# Patient Record
Sex: MALE | Race: OTHER
[De-identification: names, ages, dates, MRNs, and addresses within clinical notes are randomized per-mention and may not be internally consistent; named-entity substitution may affect disease eponyms.]

---

## 2019-01-08 ENCOUNTER — APPOINTMENT (OUTPATIENT)
Dept: ORTHOPEDIC SURGERY | Facility: CLINIC | Age: 54
End: 2019-01-08
Payer: MEDICAID

## 2019-01-08 VITALS
HEART RATE: 83 BPM | DIASTOLIC BLOOD PRESSURE: 85 MMHG | WEIGHT: 240 LBS | SYSTOLIC BLOOD PRESSURE: 128 MMHG | HEIGHT: 69 IN | BODY MASS INDEX: 35.55 KG/M2

## 2019-01-08 DIAGNOSIS — Z78.9 OTHER SPECIFIED HEALTH STATUS: ICD-10-CM

## 2019-01-08 PROBLEM — Z00.00 ENCOUNTER FOR PREVENTIVE HEALTH EXAMINATION: Status: ACTIVE | Noted: 2019-01-08

## 2019-01-08 PROCEDURE — 99205 OFFICE O/P NEW HI 60 MIN: CPT

## 2019-01-08 PROCEDURE — 72100 X-RAY EXAM L-S SPINE 2/3 VWS: CPT

## 2019-01-08 NOTE — CONSULT LETTER
[Dear  ___] : Dear  [unfilled], [I had the pleasure of evaluating your patient, [unfilled].] : I had the pleasure of evaluating your patient, [unfilled]. [Please see my note below.] : Please see my note below. [Sincerely,] : Sincerely, [FreeTextEntry3] : Denny Clemens MD

## 2019-01-08 NOTE — DISCUSSION/SUMMARY
[de-identified] : 54 yo male with lumbar herniated disc/lateral recess stenosis L45 right side with failure of PT, NSAIDS, and JOHN. Recommend Posterior Rigth L45 hemilaminotomy and discectomy.  Updated MRI prior to surgery. \par \par I reviewed all major risks, benefits, and complications associated with lumbar laminectomy and/or microdiscectomy including but not limited to bleeding, infection, CSF leak, neurological injury, chronic pain, reherniation, hematoma worsening of pain, anesthetic risk, chronic pain and disability, need for further surgical intervention, medical complications (GI, , DVT, PE, cardiac, pulmonary, etc) and risk of mortality.\par

## 2019-01-08 NOTE — HISTORY OF PRESENT ILLNESS
[All Other ROS Normal] : All other review of systems are negative except as noted [Joint Pain] : joint pain [All Hx] : past medical, family, and social [All] : medication and allergy [Walking] : worsened by walking [Acupuncture] : relieved by acupuncture [Spinal Injections] : relieved by spinal injections [Physical Therapy] : relieved by physical therapy [Rest] : relieved by rest [NSAIDs] : relieved by nonsteroidal anti-inflammatory drugs [Recumbency] : relieved by recumbency [8] : currently ~his/her~ pain is 8 out of 10 [Bending] : not worsened by bending [Lifting] : not worsened by lifting [Prolonged Sitting] : not worsened by prolonged sitting [Prolonged Standing] : not worsened by prolonged standing [Incontinence] : no incontinence [Urinary Ret.] : no urinary retention [Joint Swelling] : no joint swelling [FreeTextEntry1] : Low back pain. recommended for evaluation by Dr Wenceslao Ames [FreeTextEntry2] : WALE PEMBERTON is a 53 y.o. male who presents with c/o intermittent low back pain for the past 1 year s/p slip form chair.  Pain radiates to right buttocks and posterior aspect of right calf, associated with tinglig on toes of right foot, denies numbness, weakness on B/L LE. Pt takes Tizanidine, this provides mild relief in pain, pt completed PT, this provided relief in pain for a few hrs. Pt had LESI X 5 last injection 2 months ago, performed by Dr Ames, this provided 95% temporary improvement in pain for 2-3 months. Pt states pain affects ADL\par

## 2019-01-08 NOTE — PHYSICAL EXAM
[Normal] : normal [ALL] : dorsalis pedis, posterior tibial, femoral, popliteal, and radial 2+ and symmetric bilaterally [Limited] : is limited [Painful] : is painful [SLR] : positive straight leg raise [NL] : Motor strength of the left lower extremity was normal [Motor Strength Lower Extremities] : right (5/5) [] : Sensory: [L5-RT] : L5 [0] : right ankle jerk 0 [1+] : left ankle jerk 1+ [Poor Appearance] : well-appearing [Acute Distress] : not in acute distress [Obese] : not obese [Abl Mood] : in a normal mood [Abl Affect] : with normal affect [Poor Coordination] : normal coordination [Disorientation] : oriented x 3 [Mackey's Sign] : negative Mackey's sign [Pronator Drift] : negative pronator drift [FreeTextEntry2] : NTTP L spine and b/l paraspinals L region. \par Skin intact L spine. No rashes, ulcers, blisters. \par No lymphedema. \par Rapid alternating movements- Intact. \par Full and non-painful ROM RUE, LUE, RLE, and LLE. Skin intact RUE, LUE, RLE, and LLE. No rashes, blisters, ulcers. NTTP RUE, LUE, RLE, and LLE. No evidence of dislocation or subluxation B/L.\par  [de-identified] : Lumbar [de-identified] : 2 views AP and Lat of LS Spine from [id number] 01/08/19. Read and dictated by Dr. Denny Clemens Board Certified orthopaedic surgeon DDD L4-S1\par \par \par MRI lumbar spine Maikel Corona 02/15/18: DDD and facet arthrosis with impingement of the exiting right L4 and descending right L5 nerve roots at L4-L5, and impingement of the exiting left greater than right L5 nerve roots and descending left S1 nerve root at L5-S1.

## 2019-01-28 ENCOUNTER — RX RENEWAL (OUTPATIENT)
Age: 54
End: 2019-01-28

## 2019-01-31 ENCOUNTER — APPOINTMENT (OUTPATIENT)
Dept: ORTHOPEDIC SURGERY | Facility: CLINIC | Age: 54
End: 2019-01-31
Payer: MEDICAID

## 2019-01-31 PROCEDURE — 99214 OFFICE O/P EST MOD 30 MIN: CPT

## 2019-01-31 NOTE — DISCUSSION/SUMMARY
[de-identified] : 52 yo male with lumbar herniated disc/lateral recess stenosis L45 right side with failure of PT, NSAIDS, and JOHN. Recommend Posterior Rigth L45 hemilaminotomy and discectomy. Updated MRI prior to surgery. \par \par I reviewed all major risks, benefits, and complications associated with lumbar laminectomy and/or microdiscectomy including but not limited to bleeding, infection, CSF leak, neurological injury, chronic pain, reherniation, hematoma worsening of pain, anesthetic risk, chronic pain and disability, need for further surgical intervention, medical complications (GI, , DVT, PE, cardiac, pulmonary, etc) and risk of mortality.\par

## 2019-01-31 NOTE — PHYSICAL EXAM
[] : Motor: [Motor Strength Lower Extremities] : right (weak) [NL] : normal and symmetric bilaterally [UE/LE] : Sensory: Intact in bilateral upper & lower extremities [0] : right ankle jerk 0 [1+] : left ankle jerk 1+ [ALL] : dorsalis pedis, posterior tibial, femoral, popliteal, and radial 2+ and symmetric bilaterally [de-identified] : Range of Motion: is limited and is painful. Lumbar. \par NTTP L spine and b/l paraspinals L region. \par Skin intact L spine. No rashes, ulcers, blisters. \par No lymphedema. [de-identified] : 2 views AP and Lat of LS Spine from [id number] 01/08/19. Read and dictated by Dr. Denny Clemens Board Certified orthopaedic surgeon DDD L4-S1\par \par \par MRI lumbar spine Rehanfrederic Chloe 02/15/18: DDD and facet arthrosis with impingement of the exiting right L4 and descending right L5 nerve roots at L4-L5, and impingement of the exiting left greater than right L5 nerve roots and descending left S1 nerve root at L5-S1. \par

## 2019-01-31 NOTE — HISTORY OF PRESENT ILLNESS
[Walking] : worsened by walking [Acupuncture] : relieved by acupuncture [NSAIDs] : relieved by nonsteroidal anti-inflammatory drugs [Physical Therapy] : relieved by physical therapy [Recumbency] : relieved by recumbency [Rest] : relieved by rest [7] : a current pain level of 7/10 [1] : a minimum pain level of 1/10 [9] : a maximum pain level of 9/10 [Constant] : ~He/She~ states the symptoms seem to be constant [de-identified] : Pt with lumbar herniated disc/lateral recess stenosis L45 right side with failure of PT, NSAIDS, and JOHN. Presents today for f/u results of lumbar MRI and to discuss surgical intervention Pain radiates to right buttocks and posterior aspect of right calf, associated with tingling on toes of right foot, denies numbness, weakness on B/L LE. Pt takes Tizanidine, this provides mild relief in pain, pt completed PT, this provided relief in pain for a few hrs. Pt had LESI X 5 last injection 2 months ago, performed by Dr Ames, this provided 95% temporary improvement in pain for 2-3 months. Pt states pain affects ADL\par  [Bending] : not worsened by bending [Lifting] : not worsened by lifting [Prolonged Sitting] : not worsened by prolonged sitting [Prolonged Standing] : not worsened by prolonged standing [Incontinence] : no incontinence [Urinary Ret.] : no urinary retention

## 2019-02-01 ENCOUNTER — OUTPATIENT (OUTPATIENT)
Dept: OUTPATIENT SERVICES | Facility: HOSPITAL | Age: 54
LOS: 1 days | End: 2019-02-01
Payer: MEDICAID

## 2019-02-01 VITALS
OXYGEN SATURATION: 96 % | SYSTOLIC BLOOD PRESSURE: 126 MMHG | HEART RATE: 70 BPM | RESPIRATION RATE: 15 BRPM | DIASTOLIC BLOOD PRESSURE: 90 MMHG | HEIGHT: 69 IN | TEMPERATURE: 98 F | WEIGHT: 246.92 LBS

## 2019-02-01 DIAGNOSIS — M51.26 OTHER INTERVERTEBRAL DISC DISPLACEMENT, LUMBAR REGION: ICD-10-CM

## 2019-02-01 DIAGNOSIS — Z98.890 OTHER SPECIFIED POSTPROCEDURAL STATES: Chronic | ICD-10-CM

## 2019-02-01 DIAGNOSIS — M54.5 LOW BACK PAIN: ICD-10-CM

## 2019-02-01 DIAGNOSIS — Z29.9 ENCOUNTER FOR PROPHYLACTIC MEASURES, UNSPECIFIED: ICD-10-CM

## 2019-02-01 DIAGNOSIS — M54.16 RADICULOPATHY, LUMBAR REGION: ICD-10-CM

## 2019-02-01 DIAGNOSIS — Z01.818 ENCOUNTER FOR OTHER PREPROCEDURAL EXAMINATION: ICD-10-CM

## 2019-02-01 DIAGNOSIS — Z93.3 COLOSTOMY STATUS: Chronic | ICD-10-CM

## 2019-02-01 DIAGNOSIS — G47.33 OBSTRUCTIVE SLEEP APNEA (ADULT) (PEDIATRIC): ICD-10-CM

## 2019-02-01 LAB
HCT VFR BLD CALC: 49 % — SIGNIFICANT CHANGE UP (ref 39–50)
HGB BLD-MCNC: 16.9 G/DL — SIGNIFICANT CHANGE UP (ref 13–17)
MCHC RBC-ENTMCNC: 32.4 PG — SIGNIFICANT CHANGE UP (ref 27–34)
MCHC RBC-ENTMCNC: 34.5 GM/DL — SIGNIFICANT CHANGE UP (ref 32–36)
MCV RBC AUTO: 93.9 FL — SIGNIFICANT CHANGE UP (ref 80–100)
MRSA PCR RESULT.: SIGNIFICANT CHANGE UP
PLATELET # BLD AUTO: 247 K/UL — SIGNIFICANT CHANGE UP (ref 150–400)
RBC # BLD: 5.22 M/UL — SIGNIFICANT CHANGE UP (ref 4.2–5.8)
RBC # FLD: 13.3 % — SIGNIFICANT CHANGE UP (ref 10.3–14.5)
S AUREUS DNA NOSE QL NAA+PROBE: SIGNIFICANT CHANGE UP
WBC # BLD: 7.01 K/UL — SIGNIFICANT CHANGE UP (ref 3.8–10.5)
WBC # FLD AUTO: 7.01 K/UL — SIGNIFICANT CHANGE UP (ref 3.8–10.5)

## 2019-02-01 PROCEDURE — 87640 STAPH A DNA AMP PROBE: CPT

## 2019-02-01 PROCEDURE — G0463: CPT

## 2019-02-01 PROCEDURE — G9001: CPT

## 2019-02-01 PROCEDURE — 87641 MR-STAPH DNA AMP PROBE: CPT

## 2019-02-01 PROCEDURE — 85027 COMPLETE CBC AUTOMATED: CPT

## 2019-02-01 NOTE — H&P PST ADULT - ATTENDING COMMENTS
54 yo male with lumbar herniated disc/lateral recess stenosis L45 right side with failure of PT, NSAIDS, and JOHN. Recommend Posterior Rigth L45 hemilaminotomy and discectomy.     I reviewed all major risks, benefits, and complications associated with lumbar laminectomy and/or microdiscectomy including but not limited to bleeding, infection, CSF leak, neurological injury, chronic pain, reherniation, hematoma worsening of pain, anesthetic risk, chronic pain and disability, need for further surgical intervention, medical complications (GI, , DVT, PE, cardiac, pulmonary, etc) and risk of mortality.

## 2019-02-01 NOTE — H&P PST ADULT - PSH
History of circumcision  age 47 History of circumcision  age 47  Status post colostomy  with appendectomy, 2006, and removal 2 mos later

## 2019-02-01 NOTE — H&P PST ADULT - HISTORY OF PRESENT ILLNESS
54 y/o male with hx of diverticulitis (s/p colostomy removal, 2006) c/o intermittent lower back pain for the past year, s/p slipped off a chair. Patient reports pain radiates down right buttocks to RLE with associated numbness and tingling, worsens with ambulation. Diagnostic imaging revealed lumbar herniated discs- evaluated by Dr. Clemens. Presents to [facility] for a scheduled right L4-5 posterior lumbar hemilaminotomy and discectomy on 2/8/2019.

## 2019-02-01 NOTE — H&P PST ADULT - PRO TOBACCO TYPE
cigarettes/Quit 5 month ago, smoked "1 cigarette daily" Quit 5 month ago, smoked "1-2 cigarettes daily"/cigarettes

## 2019-02-01 NOTE — H&P PST ADULT - ASSESSMENT
CAPRINI SCORE [CLOT updated 18]    AGE RELATED RISK FACTORS                                                       MOBILITY RELATED FACTORS  [x] Age 41-60 years                                            (1 Point)                    [ ] Bed rest                                                        (1 Point)  [ ] Age: 61-74 years                                           (2 Points)                  [ ] Plaster cast                                                   (2 Points)  [ ] Age= 75 years                                              (3 Points)                    [ ] Bed bound for more than 72 hours                 (2 Points)    DISEASE RELATED RISK FACTORS                                               GENDER SPECIFIC FACTORS  [ ] Edema in the lower extremities                       (1 Point)              [ ] Pregnancy                                                     (1 Point)  [ ] Varicose veins                                               (1 Point)                     [ ] Post-partum < 6 weeks                                   (1 Point)             [x] BMI > 25 Kg/m2                                            (1 Point)                     [ ] Hormonal therapy  or oral contraception          (1 Point)                 [ ] Sepsis (in the previous month)                        (1 Point)               [ ] History of pregnancy complications                 (1 point)  [ ] Pneumonia or serious lung disease                                               [ ] Unexplained or recurrent                     (1 Point)           (in the previous month)                               (1 Point)  [ ] Abnormal pulmonary function test                     (1 Point)                 SURGERY RELATED RISK FACTORS  [ ] Acute myocardial infarction                              (1 Point)               [ ]  Section                                             (1 Point)  [ ] Congestive heart failure (in the previous month)  (1 Point)      [ ] Minor surgery                                                  (1 Point)   [ ] Inflammatory bowel disease                             (1 Point)               [ ] Arthroscopic surgery                                        (2 Points)  [ ] Central venous access                                      (2 Points)                [x] General surgery lasting more than 45 minutes (2 points)  [ ] Present or previous malignancy                     (2 Points)                [ ] Elective arthroplasty                                         (5 points)    [ ] Stroke (in the previous month)                          (5 Points)                                                                                                                                                           HEMATOLOGY RELATED FACTORS                                                 TRAUMA RELATED RISK FACTORS  [ ] Prior episodes of VTE                                     (3 Points)                [ ] Fracture of the hip, pelvis, or leg                       (5 Points)  [ ] Positive family history for VTE                         (3 Points)             [ ] Acute spinal cord injury (in the previous month)  (5 Points)  [ ] Prothrombin 97094 A                                     (3 Points)               [ ] Paralysis  (less than 1 month)                             (5 Points)  [ ] Factor V Leiden                                             (3 Points)                  [ ] Multiple Trauma within 1 month                        (5 Points)  [ ] Lupus anticoagulants                                     (3 Points)                                                           [ ] Anticardiolipin antibodies                               (3 Points)                                                       [ ] High homocysteine in the blood                      (3 Points)                                             [ ] Other congenital or acquired thrombophilia      (3 Points)                                                [ ] Heparin induced thrombocytopenia                  (3 Points)                                     Total Score [    4      ]

## 2019-02-01 NOTE — H&P PST ADULT - AS BP NONINV METHOD
With hairline fracture of the right acetabular roof and femur neck.  Likely pathological.  BEATRICE with no increase in immunoglobulins.  Bone marrow biopsy 12/24 was negative for any abnormalities.  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  CT scan of the chest showed 2 nodules in the right upper lobe with the largest one looks spiculated and measures around 11 mm.  MRI brain showed no intracranial metastasis but calvarial and C2 possible bony metastasis.  Bone scan showed extensive foci of activity in the spine, ribs, pelvis, calvarium, and femora.  Oncology will f/u outpatient with results of immunotherapy foundation testing.  Dr. Shah recommends simulation (done 1/3) and treatment started 1/7 for 10 fractions.       electronic

## 2019-02-01 NOTE — H&P PST ADULT - PROBLEM SELECTOR PLAN 1
Scheduled right L4-5 posterior lumbar hemilaminotomy and discectomy on 2/8/2019  Pre-op instructions given, including chlorhexidene soap, lab work sent at PST

## 2019-02-01 NOTE — H&P PST ADULT - NSANTHOSAYNRD_GEN_A_CORE
No. MORAIMA screening performed.  STOP BANG Legend: 0-2 = LOW Risk; 3-4 = INTERMEDIATE Risk; 5-8 = HIGH Risk/Neck 18.5"

## 2019-02-07 ENCOUNTER — TRANSCRIPTION ENCOUNTER (OUTPATIENT)
Age: 54
End: 2019-02-07

## 2019-02-08 ENCOUNTER — APPOINTMENT (OUTPATIENT)
Dept: ORTHOPEDIC SURGERY | Facility: HOSPITAL | Age: 54
End: 2019-02-08

## 2019-02-08 ENCOUNTER — RESULT REVIEW (OUTPATIENT)
Age: 54
End: 2019-02-08

## 2019-02-08 ENCOUNTER — INPATIENT (INPATIENT)
Facility: HOSPITAL | Age: 54
LOS: 0 days | Discharge: HOME | DRG: 520 | End: 2019-02-08
Attending: ORTHOPAEDIC SURGERY | Admitting: ORTHOPAEDIC SURGERY
Payer: MEDICAID

## 2019-02-08 VITALS
TEMPERATURE: 98 F | OXYGEN SATURATION: 97 % | SYSTOLIC BLOOD PRESSURE: 125 MMHG | DIASTOLIC BLOOD PRESSURE: 84 MMHG | RESPIRATION RATE: 18 BRPM | HEART RATE: 94 BPM

## 2019-02-08 VITALS
HEIGHT: 69 IN | RESPIRATION RATE: 18 BRPM | HEART RATE: 84 BPM | OXYGEN SATURATION: 97 % | WEIGHT: 246.92 LBS | SYSTOLIC BLOOD PRESSURE: 118 MMHG | DIASTOLIC BLOOD PRESSURE: 80 MMHG | TEMPERATURE: 98 F

## 2019-02-08 DIAGNOSIS — M51.26 OTHER INTERVERTEBRAL DISC DISPLACEMENT, LUMBAR REGION: ICD-10-CM

## 2019-02-08 DIAGNOSIS — Z93.3 COLOSTOMY STATUS: Chronic | ICD-10-CM

## 2019-02-08 DIAGNOSIS — Z98.890 OTHER SPECIFIED POSTPROCEDURAL STATES: Chronic | ICD-10-CM

## 2019-02-08 DIAGNOSIS — M54.16 RADICULOPATHY, LUMBAR REGION: ICD-10-CM

## 2019-02-08 PROCEDURE — 63047 LAM FACETEC & FORAMOT LUMBAR: CPT

## 2019-02-08 PROCEDURE — 72020 X-RAY EXAM OF SPINE 1 VIEW: CPT | Mod: 26

## 2019-02-08 PROCEDURE — 88304 TISSUE EXAM BY PATHOLOGIST: CPT | Mod: 26

## 2019-02-08 RX ORDER — DEXAMETHASONE 0.5 MG/5ML
5 ELIXIR ORAL EVERY 6 HOURS
Qty: 0 | Refills: 0 | Status: DISCONTINUED | OUTPATIENT
Start: 2019-02-08 | End: 2019-02-08

## 2019-02-08 RX ORDER — OXYCODONE HYDROCHLORIDE 5 MG/1
10 TABLET ORAL EVERY 4 HOURS
Qty: 0 | Refills: 0 | Status: DISCONTINUED | OUTPATIENT
Start: 2019-02-08 | End: 2019-02-08

## 2019-02-08 RX ORDER — DIAZEPAM 5 MG
1 TABLET ORAL
Qty: 14 | Refills: 0 | OUTPATIENT
Start: 2019-02-08 | End: 2019-02-14

## 2019-02-08 RX ORDER — DEXAMETHASONE 0.5 MG/5ML
5 ELIXIR ORAL
Qty: 36 | Refills: 0 | OUTPATIENT
Start: 2019-02-08

## 2019-02-08 RX ORDER — HYDROMORPHONE HYDROCHLORIDE 2 MG/ML
0.5 INJECTION INTRAMUSCULAR; INTRAVENOUS; SUBCUTANEOUS
Qty: 0 | Refills: 0 | Status: DISCONTINUED | OUTPATIENT
Start: 2019-02-08 | End: 2019-02-08

## 2019-02-08 RX ORDER — OXYCODONE HYDROCHLORIDE 5 MG/1
5 TABLET ORAL EVERY 4 HOURS
Qty: 0 | Refills: 0 | Status: DISCONTINUED | OUTPATIENT
Start: 2019-02-08 | End: 2019-02-08

## 2019-02-08 RX ORDER — SODIUM CHLORIDE 9 MG/ML
3 INJECTION INTRAMUSCULAR; INTRAVENOUS; SUBCUTANEOUS EVERY 8 HOURS
Qty: 0 | Refills: 0 | Status: DISCONTINUED | OUTPATIENT
Start: 2019-02-08 | End: 2019-02-08

## 2019-02-08 RX ORDER — SENNA PLUS 8.6 MG/1
2 TABLET ORAL AT BEDTIME
Qty: 0 | Refills: 0 | Status: DISCONTINUED | OUTPATIENT
Start: 2019-02-08 | End: 2019-02-08

## 2019-02-08 RX ORDER — SODIUM CHLORIDE 9 MG/ML
1000 INJECTION, SOLUTION INTRAVENOUS
Qty: 0 | Refills: 0 | Status: DISCONTINUED | OUTPATIENT
Start: 2019-02-08 | End: 2019-02-08

## 2019-02-08 RX ORDER — ACETAMINOPHEN 500 MG
2 TABLET ORAL
Qty: 0 | Refills: 0 | COMMUNITY

## 2019-02-08 RX ORDER — LIDOCAINE HCL 20 MG/ML
0.2 VIAL (ML) INJECTION ONCE
Qty: 0 | Refills: 0 | Status: DISCONTINUED | OUTPATIENT
Start: 2019-02-08 | End: 2019-02-08

## 2019-02-08 RX ORDER — OXYCODONE HYDROCHLORIDE 5 MG/1
1 TABLET ORAL
Qty: 30 | Refills: 0 | OUTPATIENT
Start: 2019-02-08

## 2019-02-08 RX ORDER — CEFAZOLIN SODIUM 1 G
2000 VIAL (EA) INJECTION ONCE
Qty: 0 | Refills: 0 | Status: DISCONTINUED | OUTPATIENT
Start: 2019-02-08 | End: 2019-02-08

## 2019-02-08 RX ORDER — FAMOTIDINE 10 MG/ML
20 INJECTION INTRAVENOUS DAILY
Qty: 0 | Refills: 0 | Status: DISCONTINUED | OUTPATIENT
Start: 2019-02-08 | End: 2019-02-08

## 2019-02-08 RX ORDER — DOCUSATE SODIUM 100 MG
100 CAPSULE ORAL THREE TIMES A DAY
Qty: 0 | Refills: 0 | Status: DISCONTINUED | OUTPATIENT
Start: 2019-02-08 | End: 2019-02-08

## 2019-02-08 RX ORDER — DIAZEPAM 5 MG
5 TABLET ORAL EVERY 12 HOURS
Qty: 0 | Refills: 0 | Status: DISCONTINUED | OUTPATIENT
Start: 2019-02-08 | End: 2019-02-08

## 2019-02-08 RX ORDER — ACETAMINOPHEN 500 MG
650 TABLET ORAL EVERY 6 HOURS
Qty: 0 | Refills: 0 | Status: DISCONTINUED | OUTPATIENT
Start: 2019-02-08 | End: 2019-02-08

## 2019-02-08 RX ADMIN — Medication 5 MILLIGRAM(S): at 12:22

## 2019-02-08 RX ADMIN — SODIUM CHLORIDE 100 MILLILITER(S): 9 INJECTION, SOLUTION INTRAVENOUS at 11:28

## 2019-02-08 RX ADMIN — OXYCODONE HYDROCHLORIDE 5 MILLIGRAM(S): 5 TABLET ORAL at 12:21

## 2019-02-08 RX ADMIN — HYDROMORPHONE HYDROCHLORIDE 0.5 MILLIGRAM(S): 2 INJECTION INTRAMUSCULAR; INTRAVENOUS; SUBCUTANEOUS at 11:00

## 2019-02-08 RX ADMIN — HYDROMORPHONE HYDROCHLORIDE 0.5 MILLIGRAM(S): 2 INJECTION INTRAMUSCULAR; INTRAVENOUS; SUBCUTANEOUS at 10:45

## 2019-02-08 NOTE — PHYSICAL THERAPY INITIAL EVALUATION ADULT - MANUAL MUSCLE TESTING RESULTS, REHAB EVAL
B/L UE and LEs grossly at least 3+/5, pt able to perform B/L SLR with no lag dem good quad set contraction bilaterally/grossly assessed due to

## 2019-02-08 NOTE — ASU DISCHARGE PLAN (ADULT/PEDIATRIC). - ITEMS TO FOLLOWUP WITH YOUR PHYSICIAN'S
Please follow up with Dr. Clemens in 1 week call his office to make an appointment.  Keep Dressing in place until you follow up with Dr. Clemens.  Take pain medication as prescribed if necessary. Please take steroid taper as prescribed.  You may ambulate as tolerated.

## 2019-02-08 NOTE — BRIEF OPERATIVE NOTE - PROCEDURE
<<-----Click on this checkbox to enter Procedure Hemilaminotomy and microdiscectomy of right part of L4 and L5  02/08/2019    Active  JUAN

## 2019-02-08 NOTE — PHYSICAL THERAPY INITIAL EVALUATION ADULT - ADDITIONAL COMMENTS
Pt lives in a  with his mother +2 steps to enter with HR, all needs met on main floor. Pt was ambulating independently without use of an AD prior and was independent with all ADLs

## 2019-02-08 NOTE — ASU DISCHARGE PLAN (ADULT/PEDIATRIC). - MEDICATION SUMMARY - MEDICATIONS TO TAKE
I will START or STAY ON the medications listed below when I get home from the hospital:    dexamethasone 1 mg oral tablet  -- 5 tab(s) by mouth every 6 hours x 4 doses  then 3 tabs by mouth every 6 hours x 4 dosees then 1 tab by mouth every 6 hours x 4 doses  -- Indication: For Pain Control    oxyCODONE 5 mg oral tablet  -- 1-2 tab(s) by mouth every 4-6 hours, As needed, Moderate - Severe Pain MDD:12  -- Indication: For Pain Control    diazePAM 5 mg oral tablet  -- 1 tab(s) by mouth every 12 hours, As needed, muscle spasm MDD:2  -- Indication: For Pain Control

## 2019-02-11 PROBLEM — M54.5 LOW BACK PAIN: Chronic | Status: ACTIVE | Noted: 2019-02-01

## 2019-02-11 PROBLEM — K57.92 DIVERTICULITIS OF INTESTINE, PART UNSPECIFIED, WITHOUT PERFORATION OR ABSCESS WITHOUT BLEEDING: Chronic | Status: ACTIVE | Noted: 2019-02-01

## 2019-02-12 LAB — SURGICAL PATHOLOGY STUDY: SIGNIFICANT CHANGE UP

## 2019-02-15 ENCOUNTER — APPOINTMENT (OUTPATIENT)
Dept: ORTHOPEDIC SURGERY | Facility: CLINIC | Age: 54
End: 2019-02-15
Payer: MEDICAID

## 2019-02-15 PROCEDURE — 99024 POSTOP FOLLOW-UP VISIT: CPT

## 2019-02-15 RX ORDER — OXYCODONE 5 MG/1
5 TABLET ORAL EVERY 8 HOURS
Qty: 90 | Refills: 0 | Status: ACTIVE | COMMUNITY
Start: 2019-02-15 | End: 1900-01-01

## 2019-02-15 NOTE — HISTORY OF PRESENT ILLNESS
[___ Days Post Op] : post op day #[unfilled] [Doing Well] : is doing well [Chills] : no chills [Fever] : no fever [Clean/Dry/Intact] : clean, dry and intact [Erythema] : not erythematous [Neuro Intact] : an unremarkable neurological exam [No ADLs] : to avoid most activities of daily living [No Work] : not to work [No Housework] : not to do housework [de-identified] : s/p right L4-5 Posterior lumbar hemilaminotomy and discectomy on 02/08/19 [de-identified] : Pt states he has mild pain on lateral aspect on B/L Calves that improves when ambulation and tingling on toes on B/L feet. Pt takes oxycodone 5mg TID and Tylenol ES, these provides mild relief in pain.  [de-identified] : Preop symptoms improved.  [de-identified] : PT, renewal of oxycodone. Mobic.

## 2019-02-21 DIAGNOSIS — Z71.89 OTHER SPECIFIED COUNSELING: ICD-10-CM

## 2019-02-21 NOTE — CHART NOTE - NSCHARTNOTEFT_GEN_A_CORE
Patient Resting without complaints.    No Chest Pain, SOB, N/V.    Pre op s/sx : RLE radiculopathy ;   Post op, patient reports: improved    Exam:   Alert/Oriented, No Acute Distress  Cards: +S1/S2, RRR  Pulm: CTAB  BACK:          Dressing: [x ] clean/dry/intact  [ ] Other:           Sensation: [ ] intact to light touch  [x ] decreased:  R toes         Motor exam: [x  ]                     [ ] Lower extremity                      PF          DF         EHL       FHL                                                                                            R        5/5        5/5        5/5       5/5                                                        L         5/5        5/5        5/5       5/5                                                                  Calves Soft/Non-tender bilaterally           [ x] warm well perfused; capillary refill <3 seconds              LABS: NONE          A/P :  53y Male s/p Hemilaminotomy and microdiscectomy of right part of L4 and L5    -    Pain control  -    Taper  -    DVT ppx: SCDs       -    Physical Therapy  -    Weight bearing status: WBAT [x ]        PWB    [ ]     TTWB  [ ]      NWB  [ ]  -    Dispo: Home p seen by PT      ***See Above  Fran OLSEN  Orthopedics  B: [phone]  S: [phone]
No indicators present

## 2019-03-21 ENCOUNTER — APPOINTMENT (OUTPATIENT)
Dept: ORTHOPEDIC SURGERY | Facility: CLINIC | Age: 54
End: 2019-03-21
Payer: MEDICAID

## 2019-03-21 PROCEDURE — 99024 POSTOP FOLLOW-UP VISIT: CPT

## 2019-03-21 RX ORDER — MELOXICAM 15 MG/1
15 TABLET ORAL
Qty: 30 | Refills: 1 | Status: ACTIVE | COMMUNITY
Start: 2019-03-21 | End: 1900-01-01

## 2019-03-21 NOTE — HISTORY OF PRESENT ILLNESS
[___ Months Post Op] : [unfilled] months post op [Clean/Dry/Intact] : clean, dry and intact [Neuro Intact] : an unremarkable neurological exam [Doing Well] : is doing well [No ADLs] : to avoid most activities of daily living [No Work] : not to work [No Housework] : not to do housework [2] : the patient reports pain that is 2/10 in severity [Chills] : no chills [Fever] : no fever [Erythema] : not erythematous [de-identified] : s/p right L4-5 Posterior lumbar hemilaminotomy and discectomy on 02/08/19 [de-identified] : Pt states symptoms are improving,overall 60 % improved. Pt takes oxycodone 5mg TID and Tylenol ES, these provides mild relief in pain. Pt participates with Pt X2/week [de-identified] : Preop symptoms improved.  [de-identified] : PT, Mobic, Tyelnol ES.

## 2019-05-02 ENCOUNTER — APPOINTMENT (OUTPATIENT)
Dept: ORTHOPEDIC SURGERY | Facility: CLINIC | Age: 54
End: 2019-05-02
Payer: MEDICAID

## 2019-05-02 PROCEDURE — 99024 POSTOP FOLLOW-UP VISIT: CPT

## 2019-05-02 NOTE — HISTORY OF PRESENT ILLNESS
[___ Months Post Op] : [unfilled] months post op [2] : the patient reports pain that is 2/10 in severity [Clean/Dry/Intact] : clean, dry and intact [Neuro Intact] : an unremarkable neurological exam [Doing Well] : is doing well [No ADLs] : to avoid most activities of daily living [No Work] : not to work [No Housework] : not to do housework [Chills] : no chills [Erythema] : not erythematous [Fever] : no fever [de-identified] : s/p right L4-5 Posterior lumbar hemilaminotomy and discectomy on 02/08/19 [de-identified] : Pt states symptoms are improving,overall 70 % improved. States he has intermittent mild right buttocks pain, numbness in 1st and 2nd digit on right foot. Pt takes Tylenol ES, and muscle relaxant PRN. last dose 2 weeks ago. Pt completed Pt 2 weeks ago, exercises at a gym . [de-identified] : PT, Mobic, Tyelnol ES.  [de-identified] : Preop symptoms improved.

## 2019-06-13 ENCOUNTER — APPOINTMENT (OUTPATIENT)
Dept: ORTHOPEDIC SURGERY | Facility: CLINIC | Age: 54
End: 2019-06-13
Payer: MEDICAID

## 2019-06-13 PROCEDURE — 99214 OFFICE O/P EST MOD 30 MIN: CPT

## 2019-08-16 ENCOUNTER — APPOINTMENT (OUTPATIENT)
Dept: ORTHOPEDIC SURGERY | Facility: CLINIC | Age: 54
End: 2019-08-16
Payer: MEDICAID

## 2019-08-16 VITALS
HEIGHT: 69 IN | BODY MASS INDEX: 37.03 KG/M2 | DIASTOLIC BLOOD PRESSURE: 70 MMHG | HEART RATE: 76 BPM | SYSTOLIC BLOOD PRESSURE: 105 MMHG | WEIGHT: 250 LBS

## 2019-08-16 PROCEDURE — 99214 OFFICE O/P EST MOD 30 MIN: CPT

## 2019-08-16 PROCEDURE — 72100 X-RAY EXAM L-S SPINE 2/3 VWS: CPT

## 2019-08-16 RX ORDER — MELOXICAM 15 MG/1
15 TABLET ORAL
Qty: 30 | Refills: 1 | Status: ACTIVE | COMMUNITY
Start: 2019-08-16 | End: 1900-01-01

## 2019-10-31 PROCEDURE — 97161 PT EVAL LOW COMPLEX 20 MIN: CPT

## 2019-10-31 PROCEDURE — 72020 X-RAY EXAM OF SPINE 1 VIEW: CPT

## 2019-10-31 PROCEDURE — C1889: CPT

## 2019-10-31 PROCEDURE — 88304 TISSUE EXAM BY PATHOLOGIST: CPT

## 2019-11-21 ENCOUNTER — APPOINTMENT (OUTPATIENT)
Dept: ORTHOPEDIC SURGERY | Facility: CLINIC | Age: 54
End: 2019-11-21
Payer: MEDICAID

## 2019-11-21 PROCEDURE — 99214 OFFICE O/P EST MOD 30 MIN: CPT

## 2019-11-21 RX ORDER — MELOXICAM 15 MG/1
15 TABLET ORAL
Qty: 30 | Refills: 1 | Status: ACTIVE | COMMUNITY
Start: 2019-02-15 | End: 1900-01-01

## 2019-11-21 RX ORDER — ACETAMINOPHEN 500 MG/1
500 TABLET, COATED ORAL
Qty: 180 | Refills: 1 | Status: ACTIVE | COMMUNITY
Start: 2019-03-21 | End: 1900-01-01

## 2020-01-23 ENCOUNTER — APPOINTMENT (OUTPATIENT)
Dept: ORTHOPEDIC SURGERY | Facility: CLINIC | Age: 55
End: 2020-01-23

## 2020-01-30 ENCOUNTER — APPOINTMENT (OUTPATIENT)
Dept: ORTHOPEDIC SURGERY | Facility: CLINIC | Age: 55
End: 2020-01-30
Payer: MEDICAID

## 2020-01-30 PROCEDURE — 99214 OFFICE O/P EST MOD 30 MIN: CPT

## 2020-04-08 NOTE — H&P PST ADULT - PAIN RATING AT ACTIVITY
Patient Education     Diabetic Ketoacidosis (DKA): Care Instructions  Your Care Instructions  Diabetic ketoacidosis (DKA) happens when the body does not have enough insulin and can't get the sugar it needs for energy. When the body can't use sugar for energy, it starts to use fat for energy. This process makes fatty acids called ketones. The ketones build up in the blood and change the chemical balance in your body. This problem can be very dangerous and needs to be treated. Without treatment, it can lead to a coma or death. DKA occurs most often in people with type 1 diabetes. But people with type 2 diabetes also can get it. DKA can be caused by many things. It can happen if you don't take enough insulin. It can also happen if you have an infection or illness like the flu. Sometimes it happens if you are very dehydrated. DKA can only be treated with insulin and fluids. These are often given in a vein (IV). Follow-up care is a key part of your treatment and safety. Be sure to make and go to all appointments, and call your doctor if you are having problems. It's also a good idea to know your test results and keep a list of the medicines you take. How can you care for yourself at home? To reduce your chance of ketoacidosis:  · Take your insulin and other diabetes medicines on time and in the right dose. ? If an infection caused your DKA and your doctor prescribed antibiotics, take them as directed. Do not stop taking them just because you feel better. You need to take the full course of antibiotics. · Test your blood sugar before meals and at bedtime or as often as your doctor advises. This is the best way to know when your blood sugar is high so you can treat it early. Watching for symptoms is not as helpful. This is because you may not have symptoms until your blood sugar is very high. Or you may not notice them. · Teach others at work and at home how to check your blood sugar.  Make sure that someone else knows how do it in case you can't. · Wear or carry medical identification at all times. This is very important in case you are too sick or injured to speak for yourself. · Talk to your doctor about when you can start to exercise again. · Eat regular meals that spread your calories and carbohydrate throughout the day. This will help keep your blood sugar steady. · When you are sick:  ? Take your insulin and diabetes medicines. This is important even if you are vomiting and having trouble eating or drinking. Your blood sugar may go up because you are sick. If you are eating less than normal, you may need to change your dose of insulin. Talk with your doctor about a plan when you are well. Then you will know what to do when you are sick. ? Drink extra fluids to prevent dehydration. These include water, broth, and sugar-free drinks. If you don't drink enough, the insulin from your shot may not get into your blood. So your blood sugar may go up. ? Try to eat as you normally do, with a focus on healthy food choices. ? Check your blood sugar at least every 3 to 4 hours. Check it more often if it's rising fast. If your doctor has told you to take an extra insulin dose for high blood sugar levels (for example, above 240 mg/dL) be sure to take the right amount. If you're not sure how much to take, call your doctor. ? Check your temperature and pulse often. If your temperature goes up, call your doctor. You may be getting worse. ? If you take insulin, check your urine or blood for ketones, especially when you have high blood sugar (for example, above 240 mg/dL). Call your doctor if your ketone level is moderate or high. If you know your blood sugar is high, treat it before it gets worse. · If you missed your usual dose of insulin or other diabetes medicine, take the missed dose or take the amount your doctor told you to take if this happens.   · If you and your doctor decide on a dose of extra-fast-acting insulin, give yourself the right dose. If you take insulin and your doctor has not told you how much fast-acting insulin to take based on your blood sugar level, call your doctor. · Drink extra water or sugar-free drinks to prevent dehydration. · Wait 30 minutes after you take extra insulin or missed medicines. Then check your blood sugar again. · If symptoms of high blood sugar get worse or your blood sugar level keeps rising, call your doctor. If you start to feel sleepy or confused, call 911. When should you call for help? Call 911 anytime you think you may need emergency care. For example, call if:    · You passed out (lost consciousness).     · You are confused or cannot think clearly.     · Your blood sugar is very high or very low.    Watch closely for changes in your health, and be sure to contact your doctor if:    · Your blood sugar stays outside the level your doctor set for you.     · You have any problems. Where can you learn more? Go to http://susi-justin.info/  Enter [id number] in the search box to learn more about \"Diabetic Ketoacidosis (DKA): Care Instructions. \"  Current as of: December 19, 2019Content Version: 12.4  © 1130-8663 [employer]. Care instructions adapted under license by [employer] (which disclaims liability or warranty for this information). If you have questions about a medical condition or this instruction, always ask your healthcare professional. [place] [address] any warranty or liability for your use of this information. 6

## 2020-10-06 ENCOUNTER — APPOINTMENT (OUTPATIENT)
Dept: ORTHOPEDIC SURGERY | Facility: CLINIC | Age: 55
End: 2020-10-06
Payer: MEDICAID

## 2020-10-06 DIAGNOSIS — M54.16 RADICULOPATHY, LUMBAR REGION: ICD-10-CM

## 2020-10-06 PROCEDURE — 99214 OFFICE O/P EST MOD 30 MIN: CPT | Mod: 95

## 2020-10-13 ENCOUNTER — APPOINTMENT (OUTPATIENT)
Dept: ORTHOPEDIC SURGERY | Facility: CLINIC | Age: 55
End: 2020-10-13
Payer: MEDICAID

## 2020-10-13 DIAGNOSIS — M48.07 SPINAL STENOSIS, LUMBOSACRAL REGION: ICD-10-CM

## 2020-10-13 PROBLEM — M54.16 LUMBAR RADICULOPATHY: Status: ACTIVE | Noted: 2019-01-08

## 2020-10-13 PROCEDURE — 99214 OFFICE O/P EST MOD 30 MIN: CPT

## 2020-10-13 NOTE — PHYSICAL EXAM
[Normal] : Oriented to person, place, and time, insight and judgement were intact and the affect was normal [de-identified] : Lumbar ROM  limited, stiffness\par NTTP L spine and B/L  paraspinals L region\par Skin intact L spine. No rashes, ulcers, blisters. \par No lymphedema. \par well healed posterior incision\par Able to move all extremities\par \par  [de-identified] : 2 views AP and Lat of Lumbar Spine from [id number] . Read and dictated by Dr. Denny Clemens Board Certified Orthopaedic surgeon 8/16/2019 - L4-S1 DDD\par \par \par \par MR - images and reports reviewed

## 2020-10-13 NOTE — HISTORY OF PRESENT ILLNESS
[de-identified] : Pt is s/p right L4-5 Posterior lumbar hemilaminotomy and discectomy on 02/08/19, Presents today for telehalth visit today. Patient was doing well until a month ago. Recurrence of symptoms, but improving. With NSAIDS, and PT. Denies bowel, bladder changes, and weight changes or fevers and chills. \par  [2] : an average pain level of 2/10 [1] : a minimum pain level of 1/10 [3] : a maximum pain level of 3/10 [Intermit.] : ~He/She~ states the symptoms seem to be intermittent

## 2020-11-05 ENCOUNTER — APPOINTMENT (OUTPATIENT)
Dept: ORTHOPEDIC SURGERY | Facility: CLINIC | Age: 55
End: 2020-11-05

## 2021-01-12 ENCOUNTER — TRANSCRIPTION ENCOUNTER (OUTPATIENT)
Age: 56
End: 2021-01-12

## 2022-03-31 ENCOUNTER — APPOINTMENT (OUTPATIENT)
Dept: ORTHOPEDIC SURGERY | Facility: CLINIC | Age: 57
End: 2022-03-31
Payer: MEDICAID

## 2022-03-31 VITALS — BODY MASS INDEX: 36.29 KG/M2 | WEIGHT: 245 LBS | HEIGHT: 69 IN

## 2022-03-31 DIAGNOSIS — M51.26 OTHER INTERVERTEBRAL DISC DISPLACEMENT, LUMBAR REGION: ICD-10-CM

## 2022-03-31 PROCEDURE — 72100 X-RAY EXAM L-S SPINE 2/3 VWS: CPT

## 2022-03-31 PROCEDURE — 99215 OFFICE O/P EST HI 40 MIN: CPT

## 2022-05-27 ENCOUNTER — APPOINTMENT (OUTPATIENT)
Dept: ORTHOPEDIC SURGERY | Facility: CLINIC | Age: 57
End: 2022-05-27

## 2022-06-03 ENCOUNTER — APPOINTMENT (OUTPATIENT)
Dept: ORTHOPEDIC SURGERY | Facility: CLINIC | Age: 57
End: 2022-06-03
Payer: MEDICAID

## 2022-06-03 DIAGNOSIS — M51.36 OTHER INTERVERTEBRAL DISC DEGENERATION, LUMBAR REGION: ICD-10-CM

## 2022-06-03 DIAGNOSIS — G96.198 OTHER DISORDERS OF MENINGES, NOT ELSEWHERE CLASSIFIED: ICD-10-CM

## 2022-06-03 PROCEDURE — 99442: CPT
